# Patient Record
Sex: FEMALE | Race: WHITE | NOT HISPANIC OR LATINO
[De-identification: names, ages, dates, MRNs, and addresses within clinical notes are randomized per-mention and may not be internally consistent; named-entity substitution may affect disease eponyms.]

---

## 2019-08-05 ENCOUNTER — TRANSCRIPTION ENCOUNTER (OUTPATIENT)
Age: 59
End: 2019-08-05

## 2021-06-22 ENCOUNTER — NON-APPOINTMENT (OUTPATIENT)
Age: 61
End: 2021-06-22

## 2021-06-22 ENCOUNTER — APPOINTMENT (OUTPATIENT)
Dept: GYNECOLOGIC ONCOLOGY | Facility: CLINIC | Age: 61
End: 2021-06-22
Payer: COMMERCIAL

## 2021-06-22 VITALS
BODY MASS INDEX: 25.4 KG/M2 | HEIGHT: 62 IN | HEART RATE: 90 BPM | SYSTOLIC BLOOD PRESSURE: 130 MMHG | WEIGHT: 138 LBS | DIASTOLIC BLOOD PRESSURE: 84 MMHG

## 2021-06-22 DIAGNOSIS — Z80.3 FAMILY HISTORY OF MALIGNANT NEOPLASM OF BREAST: ICD-10-CM

## 2021-06-22 PROBLEM — Z00.00 ENCOUNTER FOR PREVENTIVE HEALTH EXAMINATION: Status: ACTIVE | Noted: 2021-06-22

## 2021-06-22 PROCEDURE — 99072 ADDL SUPL MATRL&STAF TM PHE: CPT

## 2021-06-22 PROCEDURE — 99204 OFFICE O/P NEW MOD 45 MIN: CPT

## 2021-06-22 RX ORDER — AMITRIPTYLINE HYDROCHLORIDE 75 MG/1
TABLET, FILM COATED ORAL
Refills: 0 | Status: ACTIVE | COMMUNITY

## 2021-06-22 NOTE — DISCUSSION/SUMMARY
[Reviewed Clinical Lab Test(s)] : Results of clinical tests were reviewed. [Reviewed Radiology Report(s)] : Radiology reports were reviewed. [Reviewed Radiology Film/Image(s)] : Images from radiology studies were reviewed and examined. [Discuss Alternatives/Risks/Benefits w/Patient] : All alternatives, risks, and benefits were discussed with the patient/family and all questions were answered.  Patient expressed good understanding and appreciates the importance of follow up as recommended. [FreeTextEntry1] : I sat down with Glenis and reviewed the diagnosis and management of uterine fibroids. She has been referred for consideration of surgery out of concern for a uterine sarcoma. I explained that uterine sarcoma is a rare cancer arising from the myometrium and that there are no reliable diagnostic tests to discriminate benign fibroids from malignant sarcomas. We discussed the treatment and prognosis of uterine sarcoma. Unfortunately, there are no high quality data regarding the prevalence of sarcoma in women planning surgery for presumed benign leiomyomas. The incidence of sarcoma has been estimated at 1:352 - 1:1000 in published studies. Hysterectomy is not typically recommended for the sole reason of exclusion of malignancy, however, may be considered for relief of fibroid associated symptoms. We discussed the risks and benefits of hysterectomy including, but not limited to risks of bleeding, infection, poor wound healing, VTE, intraoperative injury and anesthetic complications. Due to the risk of dissemination of unrealized malignancy, it is my practice to perform hysterectomy for large fibroids using an open technique, thus avoiding morcellation. We discussed expectations for postoperative recuperation after laparotomy. \par \par The uterus is large and bulky and the patient is experiencing pelvic pressure.  I think it would be reasonable to proceed with laparotomy and hysterectomy should she elect definitive management. I would recommend BSO concurrently. \par \par Glenis had the opportunity to ask questions and expressed understanding of the information presented. She would like to take time to consider the information presented. She will contact my office should she decide to proceed with surgery. We will obtain a CT A/P to better assess the size and shape of the uterus and evaluate if there has been a significant change since 2019. \par

## 2021-06-22 NOTE — PHYSICAL EXAM
[Absent] : CVAT: absent [Normal] : Anus and perineum: Normal sphincter tone, no masses, no prolapse. [Fully active, able to carry on all pre-disease performance without restriction] : Status 0 - Fully active, able to carry on all pre-disease performance without restriction [Abnormal] : Bimanual Exam: Abnormal [de-identified] : RLQ scar [de-identified] : 22 week irregular uterus [de-identified] : large uterus, 22 weeks, mobile

## 2021-06-22 NOTE — HISTORY OF PRESENT ILLNESS
[FreeTextEntry1] : Referring GYN - Dr. Floridalma Jenkins\par PCP - Dr. Keith Gross\par \par Ms. Hernández is a 61 yo nulliparous postmenopausal female (LMP 2011) who presents for initial consultation at the request of Dr. Jenkins for the management of an enlarging fibroid uterus. She was previously under the care of Dr. Lee having TVUS q 6 months. She was on HRT until 2019. Reports a lapse in care d/t COVID-19 pandemic. She was seen by PCP for annual PE and her abdomen was noted to be distended. She was seen by Dr. Jenkins 6/17/21 - in office TVUS revealed large fibroids, EM sampling not performed. She was referred here for further management. \par \par Denies postmenopausal bleeding, vaginal discharge or pelvic pain. Reports pelvic pressure and fullness.   Tolerating PO without N/V. Denies a change in appetite or weight. Reports normal bowel and urinary function. No other associated signed or symptoms. \par \par LABS:\par Estradiol was 54 on 11/8/19 (premenopausal)\par Progesterone was 54.2 (high)\par \par IMAGING:\par In office TVUS 6/2021: multiple fibroids, largest 8cm\par \par Pelvic MRI on 10/26/19 ([facility]): AV uterus 18.9 x 9.2 x 14.8cm. The uterus is enlarged and lobular containing multiple dark signal fibroids. The uterus appears to have increased in size since prior exam., previously 16.5cm. \par Fibroids: \par 8.3 x 8.0cm in the left fundus, previously 7.6cm prior.\par 8.4 x 4.0cm in the anterior fundus, previously 6.7cm\par 8.8 x 5.8cm in the right uterine fibroid, previously 7.6cm\par 5.4 x 4.5cm in right uterine body\par 5.7 x 3.1cm in the left uterine body which is pedunculated, previously 4.5cm\par 3.8 x 2.2cm posterior uterine\par 4.0 x 2.9cm left anterior body\par Multiple other smaller fibroids seen. \par EML 9mm. No LAD. There is a tubular fluid filled structure in the left adnexa adjacent to the uterus 6.5 x 1.2 x 2.8cm most likely a hydrosalpinx. Small FF. \par \par TVUS on 9/30/19: uterus 9.5 x 12.6 x 10cm. Fibroids noted. \par \par TVUS on 8/18/17 ([facility]): enlarged fibroid uterus 11.2 x 7.0 x 9.4cm. Posterior mid uterine fibroid 5.5 x 5.1 x 5.0cm and 4.8 x 3.9 x 4.7cm. Anterior wall serosal fibroid 4.0 x 2.3 x 3.5cm. \par \par PMHx: insomnia\par PSHx: appendectomy age 4\par Fam Hx: sister (breast cancer - age 46)\par \par \par HCM:\par PAP on 8/13/19: NEGATIVE, done 6/17/21, results pending\par Mammogram: 2019 - normal per patient\par Colonoscopy: never\par BD: many years ago\par COVID-19 vaccine series completed 4/2021, Pfizer\par \par

## 2021-07-14 ENCOUNTER — RESULT REVIEW (OUTPATIENT)
Age: 61
End: 2021-07-14

## 2021-07-14 ENCOUNTER — APPOINTMENT (OUTPATIENT)
Dept: CT IMAGING | Facility: HOSPITAL | Age: 61
End: 2021-07-14
Payer: COMMERCIAL

## 2021-07-14 ENCOUNTER — OUTPATIENT (OUTPATIENT)
Dept: OUTPATIENT SERVICES | Facility: HOSPITAL | Age: 61
LOS: 1 days | End: 2021-07-14
Payer: COMMERCIAL

## 2021-07-14 DIAGNOSIS — R19.00 INTRA-ABDOMINAL AND PELVIC SWELLING, MASS AND LUMP, UNSPECIFIED SITE: ICD-10-CM

## 2021-07-14 DIAGNOSIS — D25.9 LEIOMYOMA OF UTERUS, UNSPECIFIED: ICD-10-CM

## 2021-07-14 PROCEDURE — 74177 CT ABD & PELVIS W/CONTRAST: CPT | Mod: 26

## 2021-07-14 PROCEDURE — 74177 CT ABD & PELVIS W/CONTRAST: CPT

## 2021-07-20 ENCOUNTER — NON-APPOINTMENT (OUTPATIENT)
Age: 61
End: 2021-07-20

## 2021-07-29 ENCOUNTER — TRANSCRIPTION ENCOUNTER (OUTPATIENT)
Age: 61
End: 2021-07-29

## 2021-08-18 ENCOUNTER — NON-APPOINTMENT (OUTPATIENT)
Age: 61
End: 2021-08-18

## 2022-07-01 ENCOUNTER — NON-APPOINTMENT (OUTPATIENT)
Age: 62
End: 2022-07-01

## 2022-07-02 ENCOUNTER — NON-APPOINTMENT (OUTPATIENT)
Age: 62
End: 2022-07-02

## 2022-08-02 ENCOUNTER — APPOINTMENT (OUTPATIENT)
Dept: GYNECOLOGIC ONCOLOGY | Facility: CLINIC | Age: 62
End: 2022-08-02

## 2022-08-20 ENCOUNTER — APPOINTMENT (OUTPATIENT)
Dept: ULTRASOUND IMAGING | Facility: CLINIC | Age: 62
End: 2022-08-20

## 2022-08-20 ENCOUNTER — OUTPATIENT (OUTPATIENT)
Dept: OUTPATIENT SERVICES | Facility: HOSPITAL | Age: 62
LOS: 1 days | End: 2022-08-20
Payer: COMMERCIAL

## 2022-08-20 DIAGNOSIS — D25.9 LEIOMYOMA OF UTERUS, UNSPECIFIED: ICD-10-CM

## 2022-08-20 PROCEDURE — 76830 TRANSVAGINAL US NON-OB: CPT

## 2022-08-20 PROCEDURE — 76830 TRANSVAGINAL US NON-OB: CPT | Mod: 26

## 2022-08-31 ENCOUNTER — APPOINTMENT (OUTPATIENT)
Dept: GYNECOLOGIC ONCOLOGY | Facility: CLINIC | Age: 62
End: 2022-08-31

## 2022-08-31 VITALS
HEART RATE: 69 BPM | WEIGHT: 141 LBS | DIASTOLIC BLOOD PRESSURE: 76 MMHG | BODY MASS INDEX: 25.95 KG/M2 | SYSTOLIC BLOOD PRESSURE: 119 MMHG | HEIGHT: 62 IN

## 2022-08-31 PROCEDURE — 99214 OFFICE O/P EST MOD 30 MIN: CPT

## 2022-09-22 ENCOUNTER — OUTPATIENT (OUTPATIENT)
Dept: OUTPATIENT SERVICES | Facility: HOSPITAL | Age: 62
LOS: 1 days | End: 2022-09-22

## 2022-09-22 VITALS
OXYGEN SATURATION: 99 % | DIASTOLIC BLOOD PRESSURE: 71 MMHG | HEART RATE: 74 BPM | WEIGHT: 138.01 LBS | RESPIRATION RATE: 18 BRPM | SYSTOLIC BLOOD PRESSURE: 123 MMHG | TEMPERATURE: 98 F | HEIGHT: 61 IN

## 2022-09-22 DIAGNOSIS — Z98.890 OTHER SPECIFIED POSTPROCEDURAL STATES: Chronic | ICD-10-CM

## 2022-09-22 DIAGNOSIS — D25.9 LEIOMYOMA OF UTERUS, UNSPECIFIED: ICD-10-CM

## 2022-09-22 DIAGNOSIS — R19.00 INTRA-ABDOMINAL AND PELVIC SWELLING, MASS AND LUMP, UNSPECIFIED SITE: ICD-10-CM

## 2022-09-22 DIAGNOSIS — Z90.49 ACQUIRED ABSENCE OF OTHER SPECIFIED PARTS OF DIGESTIVE TRACT: Chronic | ICD-10-CM

## 2022-09-22 LAB
A1C WITH ESTIMATED AVERAGE GLUCOSE RESULT: 5.7 % — HIGH (ref 4–5.6)
ANION GAP SERPL CALC-SCNC: 10 MMOL/L — SIGNIFICANT CHANGE UP (ref 7–14)
BLD GP AB SCN SERPL QL: NEGATIVE — SIGNIFICANT CHANGE UP
BUN SERPL-MCNC: 15 MG/DL — SIGNIFICANT CHANGE UP (ref 7–23)
CALCIUM SERPL-MCNC: 10.2 MG/DL — SIGNIFICANT CHANGE UP (ref 8.4–10.5)
CHLORIDE SERPL-SCNC: 103 MMOL/L — SIGNIFICANT CHANGE UP (ref 98–107)
CO2 SERPL-SCNC: 25 MMOL/L — SIGNIFICANT CHANGE UP (ref 22–31)
CREAT SERPL-MCNC: 0.7 MG/DL — SIGNIFICANT CHANGE UP (ref 0.5–1.3)
EGFR: 97 ML/MIN/1.73M2 — SIGNIFICANT CHANGE UP
ESTIMATED AVERAGE GLUCOSE: 117 — SIGNIFICANT CHANGE UP
GLUCOSE SERPL-MCNC: 99 MG/DL — SIGNIFICANT CHANGE UP (ref 70–99)
HCT VFR BLD CALC: 43 % — SIGNIFICANT CHANGE UP (ref 34.5–45)
HGB BLD-MCNC: 14.2 G/DL — SIGNIFICANT CHANGE UP (ref 11.5–15.5)
MCHC RBC-ENTMCNC: 28.9 PG — SIGNIFICANT CHANGE UP (ref 27–34)
MCHC RBC-ENTMCNC: 33 GM/DL — SIGNIFICANT CHANGE UP (ref 32–36)
MCV RBC AUTO: 87.4 FL — SIGNIFICANT CHANGE UP (ref 80–100)
NRBC # BLD: 0 /100 WBCS — SIGNIFICANT CHANGE UP (ref 0–0)
NRBC # FLD: 0 K/UL — SIGNIFICANT CHANGE UP (ref 0–0)
PLATELET # BLD AUTO: 265 K/UL — SIGNIFICANT CHANGE UP (ref 150–400)
POTASSIUM SERPL-MCNC: 4.6 MMOL/L — SIGNIFICANT CHANGE UP (ref 3.5–5.3)
POTASSIUM SERPL-SCNC: 4.6 MMOL/L — SIGNIFICANT CHANGE UP (ref 3.5–5.3)
RBC # BLD: 4.92 M/UL — SIGNIFICANT CHANGE UP (ref 3.8–5.2)
RBC # FLD: 13.2 % — SIGNIFICANT CHANGE UP (ref 10.3–14.5)
RH IG SCN BLD-IMP: POSITIVE — SIGNIFICANT CHANGE UP
SODIUM SERPL-SCNC: 138 MMOL/L — SIGNIFICANT CHANGE UP (ref 135–145)
WBC # BLD: 5.85 K/UL — SIGNIFICANT CHANGE UP (ref 3.8–10.5)
WBC # FLD AUTO: 5.85 K/UL — SIGNIFICANT CHANGE UP (ref 3.8–10.5)

## 2022-09-22 PROCEDURE — 93010 ELECTROCARDIOGRAM REPORT: CPT

## 2022-09-22 NOTE — H&P PST ADULT - PROBLEM SELECTOR PLAN 1
Scheduled for exploratory laparotomy ,total abdominal hysterectomy ,bilateral salpingo oophorectomy   Preop instructions provided and patient verbalizes understanding.  Labs done and results pending. Hibiclens provided with instructions and was signed by patient. Teach-back method was utilized to assess patient's understanding. Patient verbalized understanding.    Covid test requirement discussed with pt   Medical clearance with pcp , due to advanced age , EKG comparison to be faxed Scheduled for exploratory laparotomy ,total abdominal hysterectomy ,bilateral salpingo oophorectomy   Preop instructions provided and patient verbalizes understanding.  Labs done and results pending. Hibiclens provided with instructions and was signed by patient. Teach-back method was utilized to assess patient's understanding. Patient verbalized understanding.    Covid test requirement discussed with pt   Medical clearance with pcp , due to advanced age , EKG comparison, clearance to be faxed

## 2022-09-22 NOTE — H&P PST ADULT - HISTORY OF PRESENT ILLNESS
This is a 62 y.o female LMP ,  with leiomyoma of uterus unspecified , intra-abdominal and pelvic swelling mass, and lump unspecified site . Pt had exam , sono and CT done . Pt s scheduled for surgery 10/10/22.  This is a 62 y.o female LMP ,  with leiomyoma of uterus unspecified , intra-abdominal and pelvic swelling mass, and lump unspecified site . Pt had exam , sono and CT done . Pt is scheduled for surgery 10/10/22.

## 2022-09-22 NOTE — H&P PST ADULT - ENERGY EXPENDITURE (METS)
[de-identified] : COMPLETE 4 MORE WEEKS PT\par PROGRESS TO HOME EXERCISES\par AVOID THE AT RISK POSITION \par \par FU PRN 5

## 2022-09-22 NOTE — H&P PST ADULT - NSICDXPASTSURGICALHX_GEN_ALL_CORE_FT
PAST SURGICAL HISTORY:  H/O basal cell carcinoma excision back - 2014    History of lumbar surgery 2016    S/P appendectomy 1964    S/P breast biopsy left - benign    S/P LASIK surgery 2004 - bilateral eyes

## 2022-10-08 LAB — SARS-COV-2 RNA SPEC QL NAA+PROBE: SIGNIFICANT CHANGE UP

## 2022-10-09 ENCOUNTER — TRANSCRIPTION ENCOUNTER (OUTPATIENT)
Age: 62
End: 2022-10-09

## 2022-10-10 ENCOUNTER — APPOINTMENT (OUTPATIENT)
Dept: GYNECOLOGIC ONCOLOGY | Facility: HOSPITAL | Age: 62
End: 2022-10-10

## 2022-10-10 ENCOUNTER — RESULT REVIEW (OUTPATIENT)
Age: 62
End: 2022-10-10

## 2022-10-10 ENCOUNTER — TRANSCRIPTION ENCOUNTER (OUTPATIENT)
Age: 62
End: 2022-10-10

## 2022-10-10 ENCOUNTER — INPATIENT (INPATIENT)
Facility: HOSPITAL | Age: 62
LOS: 1 days | Discharge: HOME | End: 2022-10-12
Attending: OBSTETRICS & GYNECOLOGY | Admitting: OBSTETRICS & GYNECOLOGY

## 2022-10-10 VITALS
RESPIRATION RATE: 16 BRPM | HEIGHT: 61 IN | DIASTOLIC BLOOD PRESSURE: 73 MMHG | SYSTOLIC BLOOD PRESSURE: 130 MMHG | WEIGHT: 138.01 LBS | HEART RATE: 68 BPM | OXYGEN SATURATION: 99 % | TEMPERATURE: 98 F

## 2022-10-10 DIAGNOSIS — Z98.890 OTHER SPECIFIED POSTPROCEDURAL STATES: Chronic | ICD-10-CM

## 2022-10-10 DIAGNOSIS — Z90.49 ACQUIRED ABSENCE OF OTHER SPECIFIED PARTS OF DIGESTIVE TRACT: Chronic | ICD-10-CM

## 2022-10-10 DIAGNOSIS — R19.00 INTRA-ABDOMINAL AND PELVIC SWELLING, MASS AND LUMP, UNSPECIFIED SITE: ICD-10-CM

## 2022-10-10 LAB — GLUCOSE BLDC GLUCOMTR-MCNC: 101 MG/DL — HIGH (ref 70–99)

## 2022-10-10 PROCEDURE — 58150 TOTAL HYSTERECTOMY: CPT

## 2022-10-10 PROCEDURE — 88307 TISSUE EXAM BY PATHOLOGIST: CPT | Mod: 26

## 2022-10-10 PROCEDURE — 88311 DECALCIFY TISSUE: CPT | Mod: 26

## 2022-10-10 DEVICE — LIGATING CLIPS WECK HORIZON LARGE (ORANGE) 24: Type: IMPLANTABLE DEVICE | Status: FUNCTIONAL

## 2022-10-10 DEVICE — LIGATING CLIPS WECK HORIZON MEDIUM (BLUE) 24: Type: IMPLANTABLE DEVICE | Status: FUNCTIONAL

## 2022-10-10 RX ORDER — CHLORHEXIDINE GLUCONATE 213 G/1000ML
1 SOLUTION TOPICAL ONCE
Refills: 0 | Status: COMPLETED | OUTPATIENT
Start: 2022-10-10 | End: 2022-10-10

## 2022-10-10 RX ORDER — ACETAMINOPHEN 500 MG
1000 TABLET ORAL ONCE
Refills: 0 | Status: COMPLETED | OUTPATIENT
Start: 2022-10-10 | End: 2022-10-10

## 2022-10-10 RX ORDER — SIMETHICONE 80 MG/1
80 TABLET, CHEWABLE ORAL
Refills: 0 | Status: DISCONTINUED | OUTPATIENT
Start: 2022-10-10 | End: 2022-10-10

## 2022-10-10 RX ORDER — OXYCODONE HYDROCHLORIDE 5 MG/1
5 TABLET ORAL EVERY 4 HOURS
Refills: 0 | Status: DISCONTINUED | OUTPATIENT
Start: 2022-10-10 | End: 2022-10-12

## 2022-10-10 RX ORDER — KETOROLAC TROMETHAMINE 30 MG/ML
30 SYRINGE (ML) INJECTION EVERY 6 HOURS
Refills: 0 | Status: DISCONTINUED | OUTPATIENT
Start: 2022-10-10 | End: 2022-10-10

## 2022-10-10 RX ORDER — ACETAMINOPHEN 500 MG
1000 TABLET ORAL ONCE
Refills: 0 | Status: COMPLETED | OUTPATIENT
Start: 2022-10-11 | End: 2022-10-11

## 2022-10-10 RX ORDER — OXYCODONE HYDROCHLORIDE 5 MG/1
5 TABLET ORAL EVERY 6 HOURS
Refills: 0 | Status: DISCONTINUED | OUTPATIENT
Start: 2022-10-10 | End: 2022-10-10

## 2022-10-10 RX ORDER — LANOLIN ALCOHOL/MO/W.PET/CERES
1 CREAM (GRAM) TOPICAL
Qty: 0 | Refills: 0 | DISCHARGE

## 2022-10-10 RX ORDER — KETOROLAC TROMETHAMINE 30 MG/ML
30 SYRINGE (ML) INJECTION EVERY 6 HOURS
Refills: 0 | Status: DISCONTINUED | OUTPATIENT
Start: 2022-10-10 | End: 2022-10-11

## 2022-10-10 RX ORDER — HYDROMORPHONE HYDROCHLORIDE 2 MG/ML
0.5 INJECTION INTRAMUSCULAR; INTRAVENOUS; SUBCUTANEOUS
Refills: 0 | Status: DISCONTINUED | OUTPATIENT
Start: 2022-10-10 | End: 2022-10-10

## 2022-10-10 RX ORDER — OXYCODONE HYDROCHLORIDE 5 MG/1
2.5 TABLET ORAL EVERY 4 HOURS
Refills: 0 | Status: DISCONTINUED | OUTPATIENT
Start: 2022-10-10 | End: 2022-10-12

## 2022-10-10 RX ORDER — SODIUM CHLORIDE 9 MG/ML
1000 INJECTION, SOLUTION INTRAVENOUS
Refills: 0 | Status: DISCONTINUED | OUTPATIENT
Start: 2022-10-10 | End: 2022-10-10

## 2022-10-10 RX ORDER — INFLUENZA VIRUS VACCINE 15; 15; 15; 15 UG/.5ML; UG/.5ML; UG/.5ML; UG/.5ML
0.5 SUSPENSION INTRAMUSCULAR ONCE
Refills: 0 | Status: DISCONTINUED | OUTPATIENT
Start: 2022-10-10 | End: 2022-10-12

## 2022-10-10 RX ORDER — ONDANSETRON 8 MG/1
4 TABLET, FILM COATED ORAL EVERY 8 HOURS
Refills: 0 | Status: DISCONTINUED | OUTPATIENT
Start: 2022-10-10 | End: 2022-10-10

## 2022-10-10 RX ORDER — SENNA PLUS 8.6 MG/1
2 TABLET ORAL AT BEDTIME
Refills: 0 | Status: DISCONTINUED | OUTPATIENT
Start: 2022-10-11 | End: 2022-10-12

## 2022-10-10 RX ORDER — HEPARIN SODIUM 5000 [USP'U]/ML
5000 INJECTION INTRAVENOUS; SUBCUTANEOUS EVERY 8 HOURS
Refills: 0 | Status: DISCONTINUED | OUTPATIENT
Start: 2022-10-10 | End: 2022-10-11

## 2022-10-10 RX ORDER — ONDANSETRON 8 MG/1
4 TABLET, FILM COATED ORAL EVERY 6 HOURS
Refills: 0 | Status: DISCONTINUED | OUTPATIENT
Start: 2022-10-10 | End: 2022-10-12

## 2022-10-10 RX ORDER — ONDANSETRON 8 MG/1
4 TABLET, FILM COATED ORAL ONCE
Refills: 0 | Status: DISCONTINUED | OUTPATIENT
Start: 2022-10-10 | End: 2022-10-10

## 2022-10-10 RX ORDER — SODIUM CHLORIDE 9 MG/ML
1000 INJECTION, SOLUTION INTRAVENOUS
Refills: 0 | Status: DISCONTINUED | OUTPATIENT
Start: 2022-10-10 | End: 2022-10-11

## 2022-10-10 RX ORDER — METOCLOPRAMIDE HCL 10 MG
10 TABLET ORAL ONCE
Refills: 0 | Status: COMPLETED | OUTPATIENT
Start: 2022-10-10 | End: 2022-10-10

## 2022-10-10 RX ORDER — SIMETHICONE 80 MG/1
80 TABLET, CHEWABLE ORAL
Refills: 0 | Status: DISCONTINUED | OUTPATIENT
Start: 2022-10-10 | End: 2022-10-12

## 2022-10-10 RX ORDER — PANTOPRAZOLE SODIUM 20 MG/1
40 TABLET, DELAYED RELEASE ORAL
Refills: 0 | Status: DISCONTINUED | OUTPATIENT
Start: 2022-10-11 | End: 2022-10-12

## 2022-10-10 RX ORDER — AMITRIPTYLINE HCL 25 MG
1 TABLET ORAL
Qty: 0 | Refills: 0 | DISCHARGE

## 2022-10-10 RX ORDER — LANOLIN ALCOHOL/MO/W.PET/CERES
6 CREAM (GRAM) TOPICAL AT BEDTIME
Refills: 0 | Status: DISCONTINUED | OUTPATIENT
Start: 2022-10-10 | End: 2022-10-12

## 2022-10-10 RX ADMIN — Medication 1000 MILLIGRAM(S): at 15:31

## 2022-10-10 RX ADMIN — SODIUM CHLORIDE 125 MILLILITER(S): 9 INJECTION, SOLUTION INTRAVENOUS at 16:25

## 2022-10-10 RX ADMIN — Medication 400 MILLIGRAM(S): at 21:03

## 2022-10-10 RX ADMIN — Medication 30 MILLIGRAM(S): at 17:26

## 2022-10-10 RX ADMIN — CHLORHEXIDINE GLUCONATE 1 APPLICATION(S): 213 SOLUTION TOPICAL at 06:57

## 2022-10-10 RX ADMIN — Medication 10 MILLIGRAM(S): at 14:57

## 2022-10-10 RX ADMIN — ONDANSETRON 4 MILLIGRAM(S): 8 TABLET, FILM COATED ORAL at 12:40

## 2022-10-10 RX ADMIN — SODIUM CHLORIDE 30 MILLILITER(S): 9 INJECTION, SOLUTION INTRAVENOUS at 06:56

## 2022-10-10 RX ADMIN — HEPARIN SODIUM 5000 UNIT(S): 5000 INJECTION INTRAVENOUS; SUBCUTANEOUS at 22:04

## 2022-10-10 RX ADMIN — SODIUM CHLORIDE 125 MILLILITER(S): 9 INJECTION, SOLUTION INTRAVENOUS at 21:05

## 2022-10-10 RX ADMIN — SIMETHICONE 80 MILLIGRAM(S): 80 TABLET, CHEWABLE ORAL at 17:26

## 2022-10-10 RX ADMIN — Medication 400 MILLIGRAM(S): at 14:57

## 2022-10-10 RX ADMIN — HEPARIN SODIUM 5000 UNIT(S): 5000 INJECTION INTRAVENOUS; SUBCUTANEOUS at 16:24

## 2022-10-10 RX ADMIN — Medication 1000 MILLIGRAM(S): at 21:33

## 2022-10-10 RX ADMIN — Medication 6 MILLIGRAM(S): at 22:04

## 2022-10-10 NOTE — CHART NOTE - NSCHARTNOTEFT_GEN_A_CORE
PA GynOnc Post Op Note    Pt seen and examined at bedside in PACU resting comfortably.  Pt denies fever, chills, chest pain, SOB, nausea, vomiting, lightheadedness, dizziness.  Rodriguez catheter in place with clear urine noted    T(F): 97.2 (10-10-22 @ 12:00), Max: 98.2 (10-10-22 @ 06:08)  HR: 65 (10-10-22 @ 12:00) (59 - 68)  BP: 139/74 (10-10-22 @ 12:00) (130/73 - 148/70)  RR: 17 (10-10-22 @ 12:00) (10 - 18)  SpO2: 97% (10-10-22 @ 12:00) (97% - 100%)    I&O's Detail    10 Oct 2022 07:01  -  10 Oct 2022 13:05  --------------------------------------------------------  IN:    Lactated Ringers: 250 mL  Total IN: 250 mL    OUT:    Indwelling Catheter - Urethral (mL): 800 mL  Total OUT: 800 mL    Total NET: -550 mL      Physical Exam:  Constitutional: WDWN female, NAD AxOx3  Skin: warm and dry, no breakdowns noted  Chest: s1s2+, RRR, clear to auscultation bilaterally, no w/r/r    Abdomen: soft, nondistended, no guarding, no rebound, hypoactive bowel sounds,  Appropriate tenderness noted   Incisional site: low transverse incision clean and dry with Dermabond tape intact. Abdominal binder in place  Extremities: no lower extremity edema or calf tenderness bilaterally; intermittent compression stockings in place     CAPILLARY BLOOD GLUCOSE      POCT Blood Glucose.: 101 mg/dL (10 Oct 2022 07:10)      a/p: This 62y female, s/p total abdominal hysterectomy, bilateral salpingooophorectomy, for fibroid uterus, EBL: 100cc, pt stable    CV: hemodynamically stable. am labs ordered  PUL: adequate on RA  GI: pt ordered for clear liquid diet to start as tolerated  : Rodriguez in place with clear yellow urine noted in the bag, Rodriguez Catheter to remain in place overnight  ID: afebrile, WBC stable, labs as above, ordered for AM labs  DVT prophylaxis: SQ Heparin intraop, which will continue in patient  Pain Management: controlled with IV Tylenol, Toradol, Oxycodone prn  continue IV Fluids LR@125cc/hr  d/w gyn onc team    Carmen Gutierrez, PAC  #[id number] spectra

## 2022-10-10 NOTE — ASU PREOP CHECKLIST - SELECT TESTS ORDERED
BMP/CBC/Type and Screen /BMP/CBC/Type and Screen/POCT Blood Glucose/COVID-19 FS at 7:08 am- 109/BMP/CBC/Type and Screen/POCT Blood Glucose/COVID-19

## 2022-10-10 NOTE — PATIENT PROFILE ADULT - NSTRANSFERBELONGINGSDISPO_GEN_A_NUR
Reason for Call:  Medication or medication refill:    Do you use a [facility]?  Name of the pharmacy and phone number for the current request:    [facility] [address] [place] [address]      Name of the medication requested: Oxycodone    Other request: call once ready for     Can we leave a detailed message on this number? YES    Phone number patient can be reached at: Home number on file [phone] (home)    Best Time: anytime    Call taken on 7/2/2018 at 7:33 AM by Seamus Cosby       with patient

## 2022-10-10 NOTE — BRIEF OPERATIVE NOTE - OPERATION/FINDINGS
Exam under anesthesia:  - distended abdomen   - 20 week sized mobile uterus with firm irregular contour  - normal external genitalia  - no adnexal masses appreciated  - smooth rectovaginal septum    Intraoperative Findings:  - grossly normal appearing uterus and bilateral ovaries and fallopian tubes   - bilateral ureters distant from resection site with active vermiculation without injury or defect  - excellent hemostasis    Dictation# Exam under anesthesia:  - mildly distended abdomen   - 20 week sized mobile uterus with firm irregular contour  - normal external genitalia  - no adnexal masses appreciated  - smooth rectovaginal septum    Intraoperative Findings:  - enlarged fibroid uterus, grossly normal appearing bilateral ovaries and fallopian tubes   - bilateral ureters distant from resection site with active vermiculation without injury or defect  - excellent hemostasis    Dictation# Exam under anesthesia:  - mildly distended abdomen   - 20 week sized mobile uterus with firm irregular contour  - normal external genitalia  - no adnexal masses appreciated  - smooth rectovaginal septum    Intraoperative Findings:  - enlarged fibroid uterus, grossly normal appearing bilateral ovaries and fallopian tubes   - bilateral ureters distant from resection site with active vermiculation without injury or defect  - excellent hemostasis    Dictation# [id number]

## 2022-10-10 NOTE — ASU PREOP CHECKLIST - ISOLATION PRECAUTIONS
Physical Therapy     Referred by: Vilma Mcallister PA-C; Medical Diagnosis (from order):    Diagnosis Information      Diagnosis    846.0 (ICD-9-CM) - S39.012A (ICD-10-CM) - Acute myofascial strain of lumbosacral region, initial encounter                Daily Treatment Note    Visit:  4     SUBJECTIVE                                                                                                             Reports that the R hip was a little sore after doing the strengthening of the hip last Friday. She states that she does feel like it is better than last week. She reports the left side of the low back is still painful if she sidebends away from the left side.   Pain / Symptoms:  Pain rating (out of 10): Current: 1     OBJECTIVE                                                                                                                        TREATMENT                                                                                                                  Therapeutic Exercise:  Supine hip ER isometrics 10x5 sec holds left    Lower abdominal bracing in hooklying with deadbug x10 R/L   Bird dogs with lower abdominal bracing x10 R/L (cuing required to keep hips level and for abdominal activation)  Hip flexor stretch at step x10 R/L with ipsilateral shoulder flexion; x10 with thoracic rotation with upper extremity  across body.       Manual Therapy:    Functional movement pattern for hip flexion with facilitated inferior glide of femoral head x15 each with isometric re-training at end ranges  Long axis traction of bilateral hips grade II/III 2x10 left   Lateral hip distraction with mulligan belt grade III 2x10 left   Lumbar flexion mobilizations grade II/III 2x10 each L2-L3, L3- L4  Lumbar gapping mobilization grade III 2x10 L3-L4, L2-L3  STM to left piriformis, glute muscualture, left QL and left oblique x12 mins total    Skilled input: verbal instruction/cues and tactile instruction/cues    Writer  verbally educated and received verbal consent for hand placement, positioning of patient, and techniques to be performed today from patient for therapist position for techniques, hand placement and palpation for techniques and clothing adjustments for techniques as described above and how they are pertinent to the patient's plan of care.    Home Exercise Program: Access Code: [email or web address]  URL: [email or web address]  Date: 08/18/2021  Prepared by: Jerome Gamino    Exercises  [place]- PGM clamshells - 1 x daily - 7 x weekly - 2 sets - 10 reps  [place]: 3D hip mobilization anterior to posterior - 1 x daily - 7 x weekly - 1 sets - 10 reps  [place]: 3D hip mobilization medial/lateral frontal plane - 1 x daily - 7 x weekly - 1 sets - 10 reps  Bent Knee Fallouts - 1 x daily - 7 x weekly - 2 sets - 10 reps  Verbally added dead bugs and hip flexor stretch       ASSESSMENT                                                                                                             Patti reported feeling looser after today's session. She continues to have restrictions in L2-L4 but joint mobility did improve with manual techniques. She also had musculature restrictions in the QL and wrapping towards her oblique on the left. She will continue to benefit from skilled therapy to address hip mobility and lumbar mobility.     Patient Education:   Results of above outlined education: Verbalizes understanding, Demonstrates understanding and Needs reinforcement      PLAN                                                                                                                           Suggestions for next session as indicated: Progress per plan of care, assess response to last session, continue core stabilization progressions, and continue lumbar/hip mobs and STM, possible STM to hip flexor.          Therapy procedure time and total treatment time can be found documented on the Time Entry flowsheet   none

## 2022-10-10 NOTE — PATIENT PROFILE ADULT - FALL HARM RISK - UNIVERSAL INTERVENTIONS
Bed in lowest position, wheels locked, appropriate side rails in place/Call bell, personal items and telephone in reach/Instruct patient to call for assistance before getting out of bed or chair/Non-slip footwear when patient is out of bed/[place] to call system/Physically safe environment - no spills, clutter or unnecessary equipment/Purposeful Proactive Rounding/Room/bathroom lighting operational, light cord in reach

## 2022-10-11 DIAGNOSIS — Z98.890 OTHER SPECIFIED POSTPROCEDURAL STATES: ICD-10-CM

## 2022-10-11 LAB
ANION GAP SERPL CALC-SCNC: 11 MMOL/L — SIGNIFICANT CHANGE UP (ref 7–14)
BASOPHILS # BLD AUTO: 0.01 K/UL — SIGNIFICANT CHANGE UP (ref 0–0.2)
BASOPHILS NFR BLD AUTO: 0.1 % — SIGNIFICANT CHANGE UP (ref 0–2)
BUN SERPL-MCNC: 8 MG/DL — SIGNIFICANT CHANGE UP (ref 7–23)
CALCIUM SERPL-MCNC: 9.6 MG/DL — SIGNIFICANT CHANGE UP (ref 8.4–10.5)
CHLORIDE SERPL-SCNC: 103 MMOL/L — SIGNIFICANT CHANGE UP (ref 98–107)
CO2 SERPL-SCNC: 23 MMOL/L — SIGNIFICANT CHANGE UP (ref 22–31)
CREAT SERPL-MCNC: 0.64 MG/DL — SIGNIFICANT CHANGE UP (ref 0.5–1.3)
EGFR: 99 ML/MIN/1.73M2 — SIGNIFICANT CHANGE UP
EOSINOPHIL # BLD AUTO: 0 K/UL — SIGNIFICANT CHANGE UP (ref 0–0.5)
EOSINOPHIL NFR BLD AUTO: 0 % — SIGNIFICANT CHANGE UP (ref 0–6)
GLUCOSE SERPL-MCNC: 167 MG/DL — HIGH (ref 70–99)
HCT VFR BLD CALC: 40.4 % — SIGNIFICANT CHANGE UP (ref 34.5–45)
HGB BLD-MCNC: 13.3 G/DL — SIGNIFICANT CHANGE UP (ref 11.5–15.5)
IANC: 11.79 K/UL — HIGH (ref 1.8–7.4)
IMM GRANULOCYTES NFR BLD AUTO: 0.4 % — SIGNIFICANT CHANGE UP (ref 0–0.9)
LYMPHOCYTES # BLD AUTO: 1.23 K/UL — SIGNIFICANT CHANGE UP (ref 1–3.3)
LYMPHOCYTES # BLD AUTO: 8.7 % — LOW (ref 13–44)
MAGNESIUM SERPL-MCNC: 1.8 MG/DL — SIGNIFICANT CHANGE UP (ref 1.6–2.6)
MCHC RBC-ENTMCNC: 28.5 PG — SIGNIFICANT CHANGE UP (ref 27–34)
MCHC RBC-ENTMCNC: 32.9 GM/DL — SIGNIFICANT CHANGE UP (ref 32–36)
MCV RBC AUTO: 86.7 FL — SIGNIFICANT CHANGE UP (ref 80–100)
MONOCYTES # BLD AUTO: 1.11 K/UL — HIGH (ref 0–0.9)
MONOCYTES NFR BLD AUTO: 7.8 % — SIGNIFICANT CHANGE UP (ref 2–14)
NEUTROPHILS # BLD AUTO: 11.79 K/UL — HIGH (ref 1.8–7.4)
NEUTROPHILS NFR BLD AUTO: 83 % — HIGH (ref 43–77)
NRBC # BLD: 0 /100 WBCS — SIGNIFICANT CHANGE UP (ref 0–0)
NRBC # FLD: 0 K/UL — SIGNIFICANT CHANGE UP (ref 0–0)
PHOSPHATE SERPL-MCNC: 3.1 MG/DL — SIGNIFICANT CHANGE UP (ref 2.5–4.5)
PLATELET # BLD AUTO: 241 K/UL — SIGNIFICANT CHANGE UP (ref 150–400)
POTASSIUM SERPL-MCNC: 4.2 MMOL/L — SIGNIFICANT CHANGE UP (ref 3.5–5.3)
POTASSIUM SERPL-SCNC: 4.2 MMOL/L — SIGNIFICANT CHANGE UP (ref 3.5–5.3)
RBC # BLD: 4.66 M/UL — SIGNIFICANT CHANGE UP (ref 3.8–5.2)
RBC # FLD: 13.2 % — SIGNIFICANT CHANGE UP (ref 10.3–14.5)
SODIUM SERPL-SCNC: 137 MMOL/L — SIGNIFICANT CHANGE UP (ref 135–145)
WBC # BLD: 14.19 K/UL — HIGH (ref 3.8–10.5)
WBC # FLD AUTO: 14.19 K/UL — HIGH (ref 3.8–10.5)

## 2022-10-11 RX ORDER — AMITRIPTYLINE HCL 25 MG
25 TABLET ORAL DAILY
Refills: 0 | Status: DISCONTINUED | OUTPATIENT
Start: 2022-10-11 | End: 2022-10-12

## 2022-10-11 RX ORDER — ACETAMINOPHEN 500 MG
650 TABLET ORAL EVERY 6 HOURS
Refills: 0 | Status: DISCONTINUED | OUTPATIENT
Start: 2022-10-11 | End: 2022-10-12

## 2022-10-11 RX ORDER — IBUPROFEN 200 MG
600 TABLET ORAL EVERY 6 HOURS
Refills: 0 | Status: DISCONTINUED | OUTPATIENT
Start: 2022-10-11 | End: 2022-10-12

## 2022-10-11 RX ORDER — ENOXAPARIN SODIUM 100 MG/ML
40 INJECTION SUBCUTANEOUS EVERY 24 HOURS
Refills: 0 | Status: DISCONTINUED | OUTPATIENT
Start: 2022-10-11 | End: 2022-10-12

## 2022-10-11 RX ADMIN — Medication 600 MILLIGRAM(S): at 19:24

## 2022-10-11 RX ADMIN — SENNA PLUS 2 TABLET(S): 8.6 TABLET ORAL at 21:16

## 2022-10-11 RX ADMIN — SIMETHICONE 80 MILLIGRAM(S): 80 TABLET, CHEWABLE ORAL at 17:26

## 2022-10-11 RX ADMIN — HEPARIN SODIUM 5000 UNIT(S): 5000 INJECTION INTRAVENOUS; SUBCUTANEOUS at 06:01

## 2022-10-11 RX ADMIN — Medication 25 MILLIGRAM(S): at 21:17

## 2022-10-11 RX ADMIN — Medication 30 MILLIGRAM(S): at 06:00

## 2022-10-11 RX ADMIN — Medication 650 MILLIGRAM(S): at 19:23

## 2022-10-11 RX ADMIN — Medication 600 MILLIGRAM(S): at 14:30

## 2022-10-11 RX ADMIN — Medication 650 MILLIGRAM(S): at 14:30

## 2022-10-11 RX ADMIN — Medication 6 MILLIGRAM(S): at 21:17

## 2022-10-11 RX ADMIN — Medication 400 MILLIGRAM(S): at 02:52

## 2022-10-11 RX ADMIN — ENOXAPARIN SODIUM 40 MILLIGRAM(S): 100 INJECTION SUBCUTANEOUS at 13:31

## 2022-10-11 RX ADMIN — SIMETHICONE 80 MILLIGRAM(S): 80 TABLET, CHEWABLE ORAL at 13:30

## 2022-10-11 RX ADMIN — PANTOPRAZOLE SODIUM 40 MILLIGRAM(S): 20 TABLET, DELAYED RELEASE ORAL at 06:24

## 2022-10-11 RX ADMIN — Medication 600 MILLIGRAM(S): at 19:00

## 2022-10-11 RX ADMIN — Medication 30 MILLIGRAM(S): at 00:30

## 2022-10-11 RX ADMIN — Medication 600 MILLIGRAM(S): at 13:30

## 2022-10-11 RX ADMIN — SODIUM CHLORIDE 125 MILLILITER(S): 9 INJECTION, SOLUTION INTRAVENOUS at 06:00

## 2022-10-11 RX ADMIN — Medication 650 MILLIGRAM(S): at 19:00

## 2022-10-11 RX ADMIN — Medication 30 MILLIGRAM(S): at 00:09

## 2022-10-11 RX ADMIN — SIMETHICONE 80 MILLIGRAM(S): 80 TABLET, CHEWABLE ORAL at 06:00

## 2022-10-11 RX ADMIN — Medication 650 MILLIGRAM(S): at 13:31

## 2022-10-11 RX ADMIN — SIMETHICONE 80 MILLIGRAM(S): 80 TABLET, CHEWABLE ORAL at 00:09

## 2022-10-11 NOTE — CHART NOTE - NSCHARTNOTEFT_GEN_A_CORE
Patient evaluated at the bedside, she reports feeling very well. She has been ambulating, voiding, passing flatus, and tolerating a regular diet without n/v. She is without complaint.       Vital Signs Last 24 Hrs  T(C): 36.8 (11 Oct 2022 17:40), Max: 37.7 (11 Oct 2022 01:45)  T(F): 98.2 (11 Oct 2022 17:40), Max: 99.9 (11 Oct 2022 01:45)  HR: 81 (11 Oct 2022 17:40) (66 - 81)  BP: 116/63 (11 Oct 2022 17:40) (116/63 - 128/57)  RR: 16 (11 Oct 2022 17:40) (16 - 18)  SpO2: 99% (11 Oct 2022 17:40) (96% - 100%)    GEN: well appearing, NAD  RESP: breathing comfortably on RA  AB: binder in place, nondistended, appropriately tender  Incision: C/D/I    62y POD#1, s/p ex-lap ROMEO, BSO for fibroid uterus whop is meeting all postop milestones.     Dee Guerrero MD  [facility] PGY4

## 2022-10-11 NOTE — PROGRESS NOTE ADULT - ASSESSMENT
Assessment/Plan: 62y POD#1, s/p ex-lap ROMEO, BSO (EBL = 100cc) for fibroid uterus. Patient was admitted for post-operative management. She is currently stable and meeting post-operative milestones.    Neuro: Currently on IV Tylenol and IV Toradol, plan to transition to PO pain meds. Did not require narcotic pain medications overnight.  CV: Hemodynamically stable  - F/u AM CBC  Pulm: Saturating well on RA. Increase incentive spirometry.  GI: Clear liquid diet, advance as tolerated today  - Senna, simethicone, Protonix for bowel regimen  - Zofran PRN  : Rodriguez in place with adequate UOP, output of 2100cc overnight  Heme: HSQ and SCDs when in bed   - Pt will need home AC ppx x28 days for presumed hypercoagulable state; will send home w/ Abixaban 2.5mg BID  FEN: LR@125, plan to SLIV once transitioned from CLD. Replete electrolytes PRN  ID: Afebrile  Endo: No acute inpatient issues  Dispo: continue inpatient care      Kathya Quintero, PGY1   Assessment/Plan: 62y POD#1, s/p ex-lap ROMEO, BSO (EBL = 100cc) for fibroid uterus. Patient was admitted for post-operative management. She is currently stable and meeting post-operative milestones.    Neuro: Currently on IV Tylenol and IV Toradol, plan to transition to PO pain meds. Did not require narcotic pain medications overnight.  CV: Hemodynamically stable  - H/H with appropriate drop given EBL, no anemia or indication for transfusion at this time  Pulm: Saturating well on RA. Increase incentive spirometry.  GI: Clear liquid diet, advance as tolerated today  - Senna, simethicone, Protonix for bowel regimen  - Zofran PRN  : Rodriguez in place with adequate UOP, output of 2100cc overnight. Plan to D/C Rodriguez.  Heme: HSQ and SCDs when in bed   - Pt will need home AC ppx x28 days for presumed hypercoagulable state; will send home w/ Abixaban 2.5mg BID  FEN: LR@125, plan to SLIV once transitioned from CLD. Replete electrolytes PRN  ID: Afebrile  Endo: No acute inpatient issues  Dispo: continue inpatient care      Kathya Quintero, PGY1   Assessment/Plan: 62y POD#1, s/p ex-lap ROMEO, BSO (EBL = 100cc) for fibroid uterus. Patient was admitted for post-operative management. She is currently stable and meeting post-operative milestones.    Neuro: Currently on IV Tylenol and IV Toradol, plan to transition to PO pain meds. Did not require narcotic pain medications overnight.  CV: Hemodynamically stable  - H/H with appropriate drop given EBL, no anemia or indication for transfusion at this time  Pulm: Saturating well on RA. Increase incentive spirometry.  GI: Clear liquid diet, advance as tolerated today  - Senna, simethicone, Protonix for bowel regimen  - Zofran PRN  : Rodriguez in place with adequate UOP, output of 2100cc overnight. Plan to D/C Rodriguez.  Heme: HSQ and SCDs when in bed   - Pt will need home AC ppx x28 days for presumed hypercoagulable state; will send home w/ Abixaban 2.5mg BID  FEN: LR@125, plan to SLIV once transitioned from CLD. Replete electrolytes PRN  ID: Afebrile  Endo: No acute inpatient issues  Dispo: continue inpatient care      Kathya Quintero, PGY1    Dee Guerrero MD  [facility] PGY4

## 2022-10-11 NOTE — PROGRESS NOTE ADULT - SUBJECTIVE AND OBJECTIVE BOX
Gyn ONC Progress Note POD#1  HD#2    Subjective:   Pt seen and examined at bedside. No events overnight.   Pain well controlled.   Patient ambulating/not yet ambulating.  Passing flatus/Not yet passing flatus  Tolerating **** diet.   Pt denies fever, chills, chest pain, SOB, nausea, vomiting, lightheadedness, dizziness.      Objective:  T(F): 99.9 (10-11-22 @ 01:45), Max: 99.9 (10-11-22 @ 01:45)  HR: 70 (10-11-22 @ 01:45) (59 - 73)  BP: 121/60 (10-11-22 @ 01:45) (121/60 - 148/70)  RR: 18 (10-11-22 @ 01:45) (10 - 18)  SpO2: 100% (10-11-22 @ 01:45) (97% - 100%)  Wt(kg): --  I&O's Summary    10 Oct 2022 07:01  -  11 Oct 2022 05:34  --------------------------------------------------------  IN: 1590 mL / OUT: 3800 mL / NET: -2210 mL      CAPILLARY BLOOD GLUCOSE      POCT Blood Glucose.: 101 mg/dL (10 Oct 2022 07:10)      MEDICATIONS  (STANDING):  heparin   Injectable 5000 Unit(s) SubCutaneous every 8 hours  influenza   Vaccine 0.5 milliLiter(s) IntraMuscular once  ketorolac   Injectable 30 milliGRAM(s) IV Push every 6 hours  lactated ringers. 1000 milliLiter(s) (125 mL/Hr) IV Continuous <Continuous>  melatonin 6 milliGRAM(s) Oral at bedtime  pantoprazole    Tablet 40 milliGRAM(s) Oral before breakfast  senna 2 Tablet(s) Oral at bedtime  simethicone 80 milliGRAM(s) Chew four times a day    MEDICATIONS  (PRN):  ondansetron Injectable 4 milliGRAM(s) IV Push every 6 hours PRN Nausea and/or Vomiting  oxyCODONE    IR 5 milliGRAM(s) Oral every 4 hours PRN Severe Pain (7 - 10)  oxyCODONE    IR 2.5 milliGRAM(s) Oral every 4 hours PRN Moderate Pain (4 - 6)      Physical Exam:  Constitutional: NAD  CV: normal S1, S2  Lungs: CTA b/l   Abdomen:   Incision:   Extremities:     LABS:                   Gyn ONC Progress Note POD#1  HD#2    Subjective:   Pt seen and examined at bedside. No events overnight.   Pain well controlled.   Patient not yet ambulating.  Not yet passing flatus  Tolerating clear liquid diet.   Pt denies fever, chills, chest pain, SOB, nausea, vomiting, lightheadedness, dizziness.      Objective:  T(F): 99.9 (10-11-22 @ 01:45), Max: 99.9 (10-11-22 @ 01:45)  HR: 70 (10-11-22 @ 01:45) (59 - 73)  BP: 121/60 (10-11-22 @ 01:45) (121/60 - 148/70)  RR: 18 (10-11-22 @ 01:45) (10 - 18)  SpO2: 100% (10-11-22 @ 01:45) (97% - 100%)  Wt(kg): --  I&O's Summary    10 Oct 2022 07:01  -  11 Oct 2022 05:34  --------------------------------------------------------  IN: 1590 mL / OUT: 3800 mL / NET: -2210 mL      CAPILLARY BLOOD GLUCOSE      POCT Blood Glucose.: 101 mg/dL (10 Oct 2022 07:10)      MEDICATIONS  (STANDING):  heparin   Injectable 5000 Unit(s) SubCutaneous every 8 hours  influenza   Vaccine 0.5 milliLiter(s) IntraMuscular once  ketorolac   Injectable 30 milliGRAM(s) IV Push every 6 hours  lactated ringers. 1000 milliLiter(s) (125 mL/Hr) IV Continuous <Continuous>  melatonin 6 milliGRAM(s) Oral at bedtime  pantoprazole    Tablet 40 milliGRAM(s) Oral before breakfast  senna 2 Tablet(s) Oral at bedtime  simethicone 80 milliGRAM(s) Chew four times a day    MEDICATIONS  (PRN):  ondansetron Injectable 4 milliGRAM(s) IV Push every 6 hours PRN Nausea and/or Vomiting  oxyCODONE    IR 5 milliGRAM(s) Oral every 4 hours PRN Severe Pain (7 - 10)  oxyCODONE    IR 2.5 milliGRAM(s) Oral every 4 hours PRN Moderate Pain (4 - 6)      Physical Exam:  Constitutional: NAD  CV: normal S1, S2  Lungs: CTA b/l   Abdomen: Soft, no distention, +BS, appropriately tender over incision  Incision: Pfannenstiel incision with overlying Prineo Dermabond dressing; clean/dry/intact   Extremities: No calf tenderness bilaterally, no edema    LABS:             13.3   14.19 )-----------( 241      ( 10-11 @ 05:25 )             40.4     10-11    137  |  103  |  8   ----------------------------<  167<H>  4.2   |  23  |  0.64    Ca    9.6      11 Oct 2022 05:25  Phos  3.1     10-11  Mg     1.80     10-11

## 2022-10-12 ENCOUNTER — TRANSCRIPTION ENCOUNTER (OUTPATIENT)
Age: 62
End: 2022-10-12

## 2022-10-12 VITALS
DIASTOLIC BLOOD PRESSURE: 60 MMHG | SYSTOLIC BLOOD PRESSURE: 121 MMHG | HEART RATE: 76 BPM | RESPIRATION RATE: 16 BRPM | OXYGEN SATURATION: 95 % | TEMPERATURE: 98 F

## 2022-10-12 LAB
ANION GAP SERPL CALC-SCNC: 12 MMOL/L — SIGNIFICANT CHANGE UP (ref 7–14)
BASOPHILS # BLD AUTO: 0.04 K/UL — SIGNIFICANT CHANGE UP (ref 0–0.2)
BASOPHILS NFR BLD AUTO: 0.4 % — SIGNIFICANT CHANGE UP (ref 0–2)
BUN SERPL-MCNC: 14 MG/DL — SIGNIFICANT CHANGE UP (ref 7–23)
CALCIUM SERPL-MCNC: 9.7 MG/DL — SIGNIFICANT CHANGE UP (ref 8.4–10.5)
CHLORIDE SERPL-SCNC: 102 MMOL/L — SIGNIFICANT CHANGE UP (ref 98–107)
CO2 SERPL-SCNC: 21 MMOL/L — LOW (ref 22–31)
CREAT SERPL-MCNC: 0.64 MG/DL — SIGNIFICANT CHANGE UP (ref 0.5–1.3)
EGFR: 99 ML/MIN/1.73M2 — SIGNIFICANT CHANGE UP
EOSINOPHIL # BLD AUTO: 0.06 K/UL — SIGNIFICANT CHANGE UP (ref 0–0.5)
EOSINOPHIL NFR BLD AUTO: 0.7 % — SIGNIFICANT CHANGE UP (ref 0–6)
GLUCOSE SERPL-MCNC: 111 MG/DL — HIGH (ref 70–99)
HCT VFR BLD CALC: 39.6 % — SIGNIFICANT CHANGE UP (ref 34.5–45)
HGB BLD-MCNC: 12.9 G/DL — SIGNIFICANT CHANGE UP (ref 11.5–15.5)
IANC: 5.62 K/UL — SIGNIFICANT CHANGE UP (ref 1.8–7.4)
IMM GRANULOCYTES NFR BLD AUTO: 0.2 % — SIGNIFICANT CHANGE UP (ref 0–0.9)
LYMPHOCYTES # BLD AUTO: 2.72 K/UL — SIGNIFICANT CHANGE UP (ref 1–3.3)
LYMPHOCYTES # BLD AUTO: 29.8 % — SIGNIFICANT CHANGE UP (ref 13–44)
MAGNESIUM SERPL-MCNC: 2 MG/DL — SIGNIFICANT CHANGE UP (ref 1.6–2.6)
MCHC RBC-ENTMCNC: 28.9 PG — SIGNIFICANT CHANGE UP (ref 27–34)
MCHC RBC-ENTMCNC: 32.6 GM/DL — SIGNIFICANT CHANGE UP (ref 32–36)
MCV RBC AUTO: 88.6 FL — SIGNIFICANT CHANGE UP (ref 80–100)
MONOCYTES # BLD AUTO: 0.67 K/UL — SIGNIFICANT CHANGE UP (ref 0–0.9)
MONOCYTES NFR BLD AUTO: 7.3 % — SIGNIFICANT CHANGE UP (ref 2–14)
NEUTROPHILS # BLD AUTO: 5.62 K/UL — SIGNIFICANT CHANGE UP (ref 1.8–7.4)
NEUTROPHILS NFR BLD AUTO: 61.6 % — SIGNIFICANT CHANGE UP (ref 43–77)
NRBC # BLD: 0 /100 WBCS — SIGNIFICANT CHANGE UP (ref 0–0)
NRBC # FLD: 0 K/UL — SIGNIFICANT CHANGE UP (ref 0–0)
PHOSPHATE SERPL-MCNC: 2.4 MG/DL — LOW (ref 2.5–4.5)
PLATELET # BLD AUTO: 227 K/UL — SIGNIFICANT CHANGE UP (ref 150–400)
POTASSIUM SERPL-MCNC: 4.1 MMOL/L — SIGNIFICANT CHANGE UP (ref 3.5–5.3)
POTASSIUM SERPL-SCNC: 4.1 MMOL/L — SIGNIFICANT CHANGE UP (ref 3.5–5.3)
RBC # BLD: 4.47 M/UL — SIGNIFICANT CHANGE UP (ref 3.8–5.2)
RBC # FLD: 13.3 % — SIGNIFICANT CHANGE UP (ref 10.3–14.5)
SODIUM SERPL-SCNC: 135 MMOL/L — SIGNIFICANT CHANGE UP (ref 135–145)
WBC # BLD: 9.13 K/UL — SIGNIFICANT CHANGE UP (ref 3.8–10.5)
WBC # FLD AUTO: 9.13 K/UL — SIGNIFICANT CHANGE UP (ref 3.8–10.5)

## 2022-10-12 RX ORDER — IBUPROFEN 200 MG
1 TABLET ORAL
Qty: 0 | Refills: 0 | DISCHARGE
Start: 2022-10-12

## 2022-10-12 RX ORDER — OXYCODONE HYDROCHLORIDE 5 MG/1
1 TABLET ORAL
Qty: 5 | Refills: 0
Start: 2022-10-12 | End: 2022-10-12

## 2022-10-12 RX ORDER — SODIUM,POTASSIUM PHOSPHATES 278-250MG
1 POWDER IN PACKET (EA) ORAL ONCE
Refills: 0 | Status: COMPLETED | OUTPATIENT
Start: 2022-10-12 | End: 2022-10-12

## 2022-10-12 RX ORDER — ACETAMINOPHEN 500 MG
3 TABLET ORAL
Qty: 0 | Refills: 0 | DISCHARGE
Start: 2022-10-12

## 2022-10-12 RX ADMIN — Medication 600 MILLIGRAM(S): at 13:31

## 2022-10-12 RX ADMIN — SIMETHICONE 80 MILLIGRAM(S): 80 TABLET, CHEWABLE ORAL at 01:11

## 2022-10-12 RX ADMIN — ENOXAPARIN SODIUM 40 MILLIGRAM(S): 100 INJECTION SUBCUTANEOUS at 13:31

## 2022-10-12 RX ADMIN — PANTOPRAZOLE SODIUM 40 MILLIGRAM(S): 20 TABLET, DELAYED RELEASE ORAL at 07:11

## 2022-10-12 RX ADMIN — Medication 650 MILLIGRAM(S): at 01:10

## 2022-10-12 RX ADMIN — Medication 600 MILLIGRAM(S): at 07:11

## 2022-10-12 RX ADMIN — Medication 600 MILLIGRAM(S): at 14:30

## 2022-10-12 RX ADMIN — Medication 650 MILLIGRAM(S): at 07:11

## 2022-10-12 RX ADMIN — Medication 650 MILLIGRAM(S): at 13:31

## 2022-10-12 RX ADMIN — Medication 1 TABLET(S): at 13:31

## 2022-10-12 RX ADMIN — SIMETHICONE 80 MILLIGRAM(S): 80 TABLET, CHEWABLE ORAL at 07:11

## 2022-10-12 RX ADMIN — Medication 600 MILLIGRAM(S): at 01:11

## 2022-10-12 RX ADMIN — Medication 650 MILLIGRAM(S): at 14:31

## 2022-10-12 RX ADMIN — SIMETHICONE 80 MILLIGRAM(S): 80 TABLET, CHEWABLE ORAL at 11:53

## 2022-10-12 NOTE — DISCHARGE NOTE NURSING/CASE MANAGEMENT/SOCIAL WORK - NSDCPNINST_GEN_ALL_CORE
Follow up with MD  Notify MD for signs and symptoms of infection (temperature greater than 100.4, pain not relieved by medications prescribed, chills, redness at incision site)  Stay hydrated (8-10 glasses of fluid daily)   Consent (Near Eyelid Margin)/Introductory Paragraph: The rationale for Mohs was explained to the patient and consent was obtained. The risks, benefits and alternatives to therapy were discussed in detail. Specifically, the risks of ectropion or eyelid deformity, infection, scarring, bleeding, prolonged wound healing, incomplete removal, allergy to anesthesia, nerve injury and recurrence were addressed. Prior to the procedure, the treatment site was clearly identified and confirmed by the patient. All components of Universal Protocol/PAUSE Rule completed.

## 2022-10-12 NOTE — PROGRESS NOTE ADULT - SUBJECTIVE AND OBJECTIVE BOX
Gyn ONC Progress Note POD#2  HD#3    Subjective:   Pt seen and examined at bedside. No events overnight.   Pain well controlled.   Patient ambulating/not yet ambulating.  Passing flatus/Not yet passing flatus  Tolerating **** diet.   Pt denies fever, chills, chest pain, SOB, nausea, vomiting, lightheadedness, dizziness.      Objective:  T(F): 98.1 (10-12-22 @ 01:42), Max: 99.5 (10-11-22 @ 09:58)  HR: 71 (10-12-22 @ 01:42) (66 - 81)  BP: 121/68 (10-12-22 @ 01:42) (116/63 - 128/57)  RR: 17 (10-12-22 @ 01:42) (16 - 18)  SpO2: 98% (10-12-22 @ 01:42) (96% - 99%)  Wt(kg): --  I&O's Summary    10 Oct 2022 07:01  -  11 Oct 2022 07:00  --------------------------------------------------------  IN: 2250 mL / OUT: 4850 mL / NET: -2600 mL    11 Oct 2022 07:01  -  12 Oct 2022 05:38  --------------------------------------------------------  IN: 240 mL / OUT: 1950 mL / NET: -1710 mL      CAPILLARY BLOOD GLUCOSE          MEDICATIONS  (STANDING):  acetaminophen     Tablet .. 650 milliGRAM(s) Oral every 6 hours  amitriptyline 25 milliGRAM(s) Oral daily  enoxaparin Injectable 40 milliGRAM(s) SubCutaneous every 24 hours  ibuprofen  Tablet. 600 milliGRAM(s) Oral every 6 hours  influenza   Vaccine 0.5 milliLiter(s) IntraMuscular once  melatonin 6 milliGRAM(s) Oral at bedtime  pantoprazole    Tablet 40 milliGRAM(s) Oral before breakfast  senna 2 Tablet(s) Oral at bedtime  simethicone 80 milliGRAM(s) Chew four times a day    MEDICATIONS  (PRN):  ondansetron Injectable 4 milliGRAM(s) IV Push every 6 hours PRN Nausea and/or Vomiting  oxyCODONE    IR 5 milliGRAM(s) Oral every 4 hours PRN Severe Pain (7 - 10)  oxyCODONE    IR 2.5 milliGRAM(s) Oral every 4 hours PRN Moderate Pain (4 - 6)      Physical Exam:  Constitutional: NAD  CV: normal S1, S2  Lungs: CTA b/l   Abdomen:   Incision:   Extremities:     LABS:  10-11    137    |  103    |  8      ----------------------------<  167<H>  4.2     |  23     |  0.64     Ca    9.6        11 Oct 2022 05:25  Phos  3.1       10-11  Mg     1.80      10-11 Gyn ONC Progress Note POD#2  HD#3    Subjective:   Pt seen and examined at bedside. No events overnight.   Pain well controlled.   Patient ambulating and passing flatus.   Tolerating regular diet.   Pt denies fever, chills, chest pain, SOB, nausea, vomiting, lightheadedness, dizziness.      Objective:  T(F): 98.1 (10-12-22 @ 01:42), Max: 99.5 (10-11-22 @ 09:58)  HR: 71 (10-12-22 @ 01:42) (66 - 81)  BP: 121/68 (10-12-22 @ 01:42) (116/63 - 128/57)  RR: 17 (10-12-22 @ 01:42) (16 - 18)  SpO2: 98% (10-12-22 @ 01:42) (96% - 99%)  Wt(kg): --  I&O's Summary    10 Oct 2022 07:01  -  11 Oct 2022 07:00  --------------------------------------------------------  IN: 2250 mL / OUT: 4850 mL / NET: -2600 mL    11 Oct 2022 07:01  -  12 Oct 2022 05:38  --------------------------------------------------------  IN: 240 mL / OUT: 1950 mL / NET: -1710 mL      CAPILLARY BLOOD GLUCOSE          MEDICATIONS  (STANDING):  acetaminophen     Tablet .. 650 milliGRAM(s) Oral every 6 hours  amitriptyline 25 milliGRAM(s) Oral daily  enoxaparin Injectable 40 milliGRAM(s) SubCutaneous every 24 hours  ibuprofen  Tablet. 600 milliGRAM(s) Oral every 6 hours  influenza   Vaccine 0.5 milliLiter(s) IntraMuscular once  melatonin 6 milliGRAM(s) Oral at bedtime  pantoprazole    Tablet 40 milliGRAM(s) Oral before breakfast  senna 2 Tablet(s) Oral at bedtime  simethicone 80 milliGRAM(s) Chew four times a day    MEDICATIONS  (PRN):  ondansetron Injectable 4 milliGRAM(s) IV Push every 6 hours PRN Nausea and/or Vomiting  oxyCODONE    IR 5 milliGRAM(s) Oral every 4 hours PRN Severe Pain (7 - 10)  oxyCODONE    IR 2.5 milliGRAM(s) Oral every 4 hours PRN Moderate Pain (4 - 6)      Physical Exam:  Constitutional: NAD  CV: normal S1, S2  Lungs: CTA b/l   Abdomen: Soft, mildly tender over incision, minimal distention, +BS  Incision: Pfannenstiel incision with Prineo Dermabond dressing overlying. Clean/dry/intact  Extremities: No tenderness bilaterally, no edema. SCDs in place.    LABS:             13.3   14.19 )-----------( 241      ( 10-11 @ 05:25 )             40.4     10-11    137  |  103  |  8   ----------------------------<  167<H>  4.2   |  23  |  0.64    Ca    9.6      11 Oct 2022 05:25  Phos  3.1     10-11  Mg     1.80     10-11     Gyn ONC Progress Note POD#2  HD#3    Subjective:   Pt seen and examined at bedside. No events overnight. Reports feeling some chest congestion like "mucus in throat" without any chest pain or SOB.  Pain well controlled.   Patient ambulating and passing flatus.   Tolerating regular diet.   Pt denies fever, chills, nausea, vomiting, lightheadedness, dizziness.      Objective:  T(F): 98.1 (10-12-22 @ 01:42), Max: 99.5 (10-11-22 @ 09:58)  HR: 71 (10-12-22 @ 01:42) (66 - 81)  BP: 121/68 (10-12-22 @ 01:42) (116/63 - 128/57)  RR: 17 (10-12-22 @ 01:42) (16 - 18)  SpO2: 98% (10-12-22 @ 01:42) (96% - 99%)  Wt(kg): --  I&O's Summary    10 Oct 2022 07:01  -  11 Oct 2022 07:00  --------------------------------------------------------  IN: 2250 mL / OUT: 4850 mL / NET: -2600 mL    11 Oct 2022 07:01  -  12 Oct 2022 05:38  --------------------------------------------------------  IN: 240 mL / OUT: 1950 mL / NET: -1710 mL      CAPILLARY BLOOD GLUCOSE          MEDICATIONS  (STANDING):  acetaminophen     Tablet .. 650 milliGRAM(s) Oral every 6 hours  amitriptyline 25 milliGRAM(s) Oral daily  enoxaparin Injectable 40 milliGRAM(s) SubCutaneous every 24 hours  ibuprofen  Tablet. 600 milliGRAM(s) Oral every 6 hours  influenza   Vaccine 0.5 milliLiter(s) IntraMuscular once  melatonin 6 milliGRAM(s) Oral at bedtime  pantoprazole    Tablet 40 milliGRAM(s) Oral before breakfast  senna 2 Tablet(s) Oral at bedtime  simethicone 80 milliGRAM(s) Chew four times a day    MEDICATIONS  (PRN):  ondansetron Injectable 4 milliGRAM(s) IV Push every 6 hours PRN Nausea and/or Vomiting  oxyCODONE    IR 5 milliGRAM(s) Oral every 4 hours PRN Severe Pain (7 - 10)  oxyCODONE    IR 2.5 milliGRAM(s) Oral every 4 hours PRN Moderate Pain (4 - 6)      Physical Exam:  Constitutional: NAD  CV: normal S1, S2  Lungs: CTA b/l   Abdomen: Soft, mildly tender over incision, minimal distention, +BS  Incision: Pfannenstiel incision with Prineo Dermabond dressing overlying. Clean/dry/intact  Extremities: No tenderness bilaterally, no edema. SCDs in place.    LABS:             13.3   14.19 )-----------( 241      ( 10-11 @ 05:25 )             40.4     10-11    137  |  103  |  8   ----------------------------<  167<H>  4.2   |  23  |  0.64    Ca    9.6      11 Oct 2022 05:25  Phos  3.1     10-11  Mg     1.80     10-11

## 2022-10-12 NOTE — PROGRESS NOTE ADULT - PROBLEM SELECTOR PLAN 1
GYN ONC Fellow Addendum:    Pt seen and examined at bedside. Agree with above. Pain well controlled, tolerating CLD.    VS reviewed  Labs reviewed    Continue current pain regimen  D/c FREDDY ashby  ADAT  Encourage ambulation and IS use  Replete lytes prn  DVT ppx: Lovenox  Dispo: cont inpt post op care    Samia Zuniga MD
GYN ONC Fellow Addendum:    Pt seen and examined at bedside. Agree with above. POD#2 s/p ROMEO/BSO via low transverse incision. Tolerating PO, no N/V. +flatus. Pain well controlled. Voiding spontaneously.    VS reviewed  Labs reviewed    Continue current pain regimen  Tolerating reg diet  Encourage ambulation and IS use  Replete lytes prn  DVT ppx: Lovenox  Dispo: d/c home today    Samia Zuniga MD

## 2022-10-12 NOTE — DISCHARGE NOTE PROVIDER - NSDCMRMEDTOKEN_GEN_ALL_CORE_FT
acetaminophen 325 mg oral tablet: 3 tab(s) orally every 6 hours  amitriptyline 25 mg oral tablet: 1 tab(s) orally once a day (at bedtime)  ibuprofen 600 mg oral tablet: 1 tab(s) orally every 6 hours  Melatonin 10 mg oral capsule: 1 cap(s) orally once a day (at bedtime)  oxyCODONE 5 mg oral tablet: 1 tab(s) orally every 6 hours, As Needed -Severe Pain (7 - 10) MDD:20 mg    acetaminophen 325 mg oral tablet: 3 tab(s) orally every 6 hours, As Needed  amitriptyline 25 mg oral tablet: 1 tab(s) orally once a day (at bedtime)  ibuprofen 600 mg oral tablet: 1 tab(s) orally every 6 hours, As Needed  Melatonin 10 mg oral capsule: 1 cap(s) orally once a day (at bedtime)  oxyCODONE 5 mg oral tablet: 1 tab(s) orally every 6 hours, As Needed -Severe Pain (7 - 10) MDD:20 mg

## 2022-10-12 NOTE — PROGRESS NOTE ADULT - ATTENDING COMMENTS
[employer] home dad, José Wellington, [phone]  62y POD#1, s/p ex-lap ROMEO, BSO (EBL = 100cc) for fibroid uterus. Patient was admitted for post-operative management. She is currently stable and meeting all post-operative milestones.    -AVSS, labs reviewed. Afebrile, no leukocytosis. Replete lytes as indicated.  -Hct stable and on SQH ppx. No indication for extended ppx.  -Pain well controlled with oral pain medication regimen.  -Tolerating regular diet, passing some flatus. Abdominal binder in situ.  -Ambulation and IS encouraged.  -Plan for discharge today given patient meeting all postoperative milestones.   -All questions answered to patients satisfaction.    Ivana Hardin MD      Division of Gynecologic Oncology    Department of Obstetrics and Gynecology  [facility]    [facility]

## 2022-10-12 NOTE — PROGRESS NOTE ADULT - ASSESSMENT
Assessment/Plan: 62y POD#1, s/p ex-lap ROMEO, BSO (EBL = 100cc) for fibroid uterus. Patient was admitted for post-operative management. She is currently stable and meeting post-operative milestones.    Neuro: PO pain meds. Pain well controlled.  CV: Hemodynamically stable  - H/H stable  Pulm: Saturating well on RA. Increase incentive spirometry.  GI: Regular diet  - Senna, simethicone, Protonix and Zofran PRN  : Voiding spontaneously (UOP = 1150mL overnight)  Heme: Lovenox and SCDs when in bed  FEN: SLIV, Replete electrolytes PRN  ID: Afebrile  Endo: No acute inpatient issues  Dispo: continue inpatient care      Kathya Quintero, PGY1 Assessment/Plan: 62y POD#1, s/p ex-lap ROMEO, BSO (EBL = 100cc) for fibroid uterus. Patient was admitted for post-operative management. She is currently stable and meeting post-operative milestones.    Neuro: PO pain meds. Pain well controlled.  CV: Hemodynamically stable  - F/u AM CBC  Pulm: Saturating well on RA. Increase incentive spirometry.  GI: Regular diet  - Senna, simethicone, Protonix and Zofran PRN  : Voiding spontaneously (UOP = 1150mL overnight)  Heme: Lovenox daily, and SCDs when in bed  FEN: SLIV, Replete electrolytes PRN  ID: Afebrile  Endo: No acute inpatient issues  Dispo: continue inpatient care      Kathya Quintero, PGY1 Assessment/Plan: 62y POD#1, s/p ex-lap ROMEO, BSO (EBL = 100cc) for fibroid uterus. Patient was admitted for post-operative management. She is currently stable and meeting post-operative milestones.    Neuro: PO pain meds. Pain well controlled.  CV: Hemodynamically stable  - F/u AM CBC  Pulm: Saturating well on RA. Increase incentive spirometry.  - Throat congestion may be 2/2 irritation from ETT; will consider throat lozenges or mucinex for relief if patient continues to complain of symptoms  GI: Regular diet  - Senna, simethicone, Protonix and Zofran PRN  : Voiding spontaneously (UOP = 1150mL overnight)  Heme: Lovenox daily, and SCDs when in bed  FEN: SLIV, Replete electrolytes PRN  ID: Afebrile  Endo: No acute inpatient issues  Dispo: continue inpatient care      Kathya Quintero, PGY1 Assessment/Plan: 62y POD#1, s/p ex-lap ROMEO, BSO (EBL = 100cc) for fibroid uterus. Patient was admitted for post-operative management. She is currently stable and meeting post-operative milestones.    Neuro: PO pain meds. Pain well controlled.  CV: Hemodynamically stable  - F/u AM CBC  Pulm: Saturating well on RA. Increase incentive spirometry.  - Throat congestion may be 2/2 irritation from ETT; will consider throat lozenges or mucinex for relief if patient continues to complain of symptoms  GI: Regular diet  - Senna, simethicone, Protonix and Zofran PRN  : Voiding spontaneously (UOP = 1150mL overnight)  Heme: Lovenox daily, and SCDs when in bed  FEN: SLIV, Replete electrolytes PRN  ID: Afebrile  Endo: No acute inpatient issues  Dispo: continue inpatient care      Kathya Quintero, PGY1    Dee Guerrero MD  GYNONC PGY4

## 2022-10-12 NOTE — DISCHARGE NOTE PROVIDER - NSDCFUSCHEDAPPT_GEN_ALL_CORE_FT
[facility]  GYNONC [facility]  Scheduled Appointment: 10/26/2022

## 2022-10-12 NOTE — DISCHARGE NOTE NURSING/CASE MANAGEMENT/SOCIAL WORK - PATIENT PORTAL LINK FT
You can access the FollowMyHealth Patient Portal offered by [facility] by registering at the following website: [email or web address]. By joining [employer]’s FollowMyHealth portal, you will also be able to view your health information using other applications (apps) compatible with our system.

## 2022-10-12 NOTE — DISCHARGE NOTE NURSING/CASE MANAGEMENT/SOCIAL WORK - NSDCPECAREGIVERED_GEN_ALL_CORE
Pt has transverse lower abdominal incision with abdominal binder and abdominal pad  Incision shows no signs of infection  Pt is able to ambulate, eat, void, and pass flatus without difficulty  IV discontinued and patient given supplies/No

## 2022-10-12 NOTE — DISCHARGE NOTE NURSING/CASE MANAGEMENT/SOCIAL WORK - NSDCPEFALRISK_GEN_ALL_CORE
For information on Fall & Injury Prevention, visit: [email or web address] OR  [email or web address] OR  https://www.cdc.gov/steadi/patient.html

## 2022-10-12 NOTE — DISCHARGE NOTE PROVIDER - HOSPITAL COURSE
Ms. Glenis Hernández is a 61 yo POD#2 (10/10/22) status post ex-lap ROMEO and BSO for fibroid uterus. Procedure was uncomplicated (please see operative report for details). She has remained hemodynamically stable with H/H 14.2/43 preop and 13.3/40.4 postop. Her vital signs have remained within normal limits and physical exam has been unremarkable. Her ashby was removed on POD#1 and she began to void spontaneously with appropriate UOP. She was transition to a PO regimen for pain management on POD#1 with adequate pain control and has been tolerating a regular diet without nausea or vomiting and ambulating without difficulty. She was discharged on POD#2 after meeting all appropriate postoperative milestones and has been instructed to follow up with her provider, Dr. Velasquez on 10/26/22 @ 2pm for her postoperative check.               13.3   14.19 )-----------( 241      ( 10-11 @ 05:25 )             40.4

## 2022-10-12 NOTE — DISCHARGE NOTE PROVIDER - CARE PROVIDER_API CALL
Alyssa Velasquez)  Gynecologic Oncology; Obstetrics and Gynecology  [address] [place] [address]  Phone: [phone]  Fax: [phone]  Follow Up Time:

## 2022-10-13 ENCOUNTER — NON-APPOINTMENT (OUTPATIENT)
Age: 62
End: 2022-10-13

## 2022-10-18 LAB — SURGICAL PATHOLOGY STUDY: SIGNIFICANT CHANGE UP

## 2022-10-25 PROBLEM — Z48.89 POSTOPERATIVE VISIT: Status: ACTIVE | Noted: 2022-10-25

## 2022-10-25 RX ORDER — OXYCODONE 5 MG/1
5 TABLET ORAL
Qty: 5 | Refills: 0 | Status: ACTIVE | COMMUNITY
Start: 2022-10-12

## 2022-10-26 ENCOUNTER — APPOINTMENT (OUTPATIENT)
Dept: GYNECOLOGIC ONCOLOGY | Facility: CLINIC | Age: 62
End: 2022-10-26

## 2022-10-26 VITALS — SYSTOLIC BLOOD PRESSURE: 126 MMHG | HEART RATE: 74 BPM | DIASTOLIC BLOOD PRESSURE: 85 MMHG

## 2022-10-26 DIAGNOSIS — R19.00 INTRA-ABDOMINAL AND PELVIC SWELLING, MASS AND LUMP, UNSPECIFIED SITE: ICD-10-CM

## 2022-10-26 DIAGNOSIS — Z48.89 ENCOUNTER FOR OTHER SPECIFIED SURGICAL AFTERCARE: ICD-10-CM

## 2022-10-26 DIAGNOSIS — D25.9 LEIOMYOMA OF UTERUS, UNSPECIFIED: ICD-10-CM

## 2022-10-26 PROCEDURE — 99024 POSTOP FOLLOW-UP VISIT: CPT

## 2024-10-02 NOTE — ASU PATIENT PROFILE, ADULT - NS TRANSFER HEARING AID
Called pt to remind of appointment on 10/3/2024 at 11:30 Pt answered and will be present.    
denies

## (undated) DEVICE — POSITIONER PINK PAD PIGAZZI SYSTEM

## (undated) DEVICE — GLV 6.5 PROTEXIS W HYDROGEL

## (undated) DEVICE — VENODYNE/SCD SLEEVE CALF MEDIUM

## (undated) DEVICE — DRSG TELFA 3 X 8

## (undated) DEVICE — GLV 6.5 PROTEXIS (BLUE)

## (undated) DEVICE — SUT NOVAFIL 2 60" T-60

## (undated) DEVICE — ELCTR BOVIE BLADE 3/4" EXTENDED LENGTH 6"

## (undated) DEVICE — GOWN XL

## (undated) DEVICE — SOL IRR POUR H2O 250ML

## (undated) DEVICE — PREP BETADINE SPONGE STICKS

## (undated) DEVICE — SUT QUILL MONODERM 3-0 30CM PS-2

## (undated) DEVICE — DRSG MEDIPORE + PAD 3.5X10"

## (undated) DEVICE — Device

## (undated) DEVICE — DRAPE INSTRUMENT POUCH 6.75" X 11"

## (undated) DEVICE — LIGASURE IMPACT

## (undated) DEVICE — ELCTR REM POLYHESIVE ADULT PT RETURN 15FT

## (undated) DEVICE — LAP PAD 18 X 18"

## (undated) DEVICE — SUT VICRYL 2-0 27" SH UNDYED

## (undated) DEVICE — SUT VICRYL 0 36" CT-1 UNDYED

## (undated) DEVICE — DRSG STERISTRIPS 0.5 X 4"

## (undated) DEVICE — PACK MAJOR ABDOMINAL WITH LAP

## (undated) DEVICE — FOLEY TRAY 16FR 5CC LF UMETER CLOSED

## (undated) DEVICE — SOL IRR POUR NS 0.9% 500ML

## (undated) DEVICE — BASIN SET SINGLE

## (undated) DEVICE — PACK PERI GYN

## (undated) DEVICE — DRSG MASTISOL

## (undated) DEVICE — SPONGE PEANUT AUTO COUNT

## (undated) DEVICE — WARMING BLANKET UPPER ADULT

## (undated) DEVICE — MEDICINE CUP WITH LID 60ML

## (undated) DEVICE — SUT VICRYL 3-0 27" SH UNDYED

## (undated) DEVICE — SUT VICRYL 0 18" TIES UNDYED

## (undated) DEVICE — CANISTER DISPOSABLE THIN WALL 3000CC

## (undated) DEVICE — DRAPE 3/4 SHEET 52X76"

## (undated) DEVICE — POSITIONER FOAM EGG CRATE ULNAR 2PCS (PINK)

## (undated) DEVICE — SYR ASEPTO